# Patient Record
Sex: FEMALE | Race: OTHER | Employment: OTHER | URBAN - METROPOLITAN AREA
[De-identification: names, ages, dates, MRNs, and addresses within clinical notes are randomized per-mention and may not be internally consistent; named-entity substitution may affect disease eponyms.]

---

## 2017-07-24 NOTE — PATIENT DISCUSSION
NONPROLIFERATIVE DIABETIC RETINOPATHY OD: PATIENT INSTRUCTED TO RETURN TO PCP FOR CONTINUED BLOOD SUGAR MONITORING AND RETURN FOR FOLLOW-UP AS SCHEDULED FOR DILATED FUNDUS EXAM.

## 2021-11-09 ENCOUNTER — NEW PATIENT COMPREHENSIVE (OUTPATIENT)
Dept: URBAN - METROPOLITAN AREA CLINIC 35 | Facility: CLINIC | Age: 61
End: 2021-11-09

## 2021-11-09 DIAGNOSIS — H04.123: ICD-10-CM

## 2021-11-09 DIAGNOSIS — Z83.518: ICD-10-CM

## 2021-11-09 DIAGNOSIS — H52.7: ICD-10-CM

## 2021-11-09 PROCEDURE — 92015 DETERMINE REFRACTIVE STATE: CPT

## 2021-11-09 PROCEDURE — 92004 COMPRE OPH EXAM NEW PT 1/>: CPT

## 2021-11-09 ASSESSMENT — VISUAL ACUITY
OS_CC: J1-
OD_CC: J1
OS_SC: 20/20-1
OD_SC: 20/20-2

## 2021-11-09 ASSESSMENT — TONOMETRY
OD_IOP_MMHG: 16
OS_IOP_MMHG: 16

## 2021-12-23 NOTE — PATIENT DISCUSSION
PMH discussed panoptix mf IOL with PT, he advised pt that this IOL will get him out of glasses for most things but that he may still need glasses for fine print and driving at night etc.. PMH also warned pt of possibility of glare. PT understands and want to proceed with CAT SX and multifocal IOL.

## 2021-12-23 NOTE — PATIENT DISCUSSION
RBA'S DISCUSSED, PATIENT UNDERSTANDS AND WISHES TO PROCEED WITH SURGERY. DISCUSSED IOL OPTIONS, PATIENT UNDERSTANDS AND DESIRES PANOPTIX MULTIFOCAL TORIC OS.

## 2022-11-15 ENCOUNTER — COMPREHENSIVE EXAM (OUTPATIENT)
Dept: URBAN - METROPOLITAN AREA CLINIC 35 | Facility: CLINIC | Age: 62
End: 2022-11-15

## 2022-11-15 DIAGNOSIS — H04.123: ICD-10-CM

## 2022-11-15 DIAGNOSIS — H25.813: ICD-10-CM

## 2022-11-15 DIAGNOSIS — H52.7: ICD-10-CM

## 2022-11-15 DIAGNOSIS — Z83.518: ICD-10-CM

## 2022-11-15 PROCEDURE — 92014 COMPRE OPH EXAM EST PT 1/>: CPT

## 2022-11-15 PROCEDURE — 92015 DETERMINE REFRACTIVE STATE: CPT

## 2022-11-15 ASSESSMENT — VISUAL ACUITY
OD_SC: J6
OS_SC: 20/20
OD_SC: 20/20
OU_SC: J6
OS_SC: J12
OU_SC: 20/20

## 2022-11-15 ASSESSMENT — TONOMETRY
OS_IOP_MMHG: 15
OD_IOP_MMHG: 14

## 2023-11-21 ENCOUNTER — COMPREHENSIVE EXAM (OUTPATIENT)
Dept: URBAN - METROPOLITAN AREA CLINIC 35 | Facility: CLINIC | Age: 63
End: 2023-11-21

## 2023-11-21 DIAGNOSIS — H25.813: ICD-10-CM

## 2023-11-21 DIAGNOSIS — Z83.518: ICD-10-CM

## 2023-11-21 DIAGNOSIS — H04.123: ICD-10-CM

## 2023-11-21 DIAGNOSIS — H52.7: ICD-10-CM

## 2023-11-21 PROCEDURE — 92015 DETERMINE REFRACTIVE STATE: CPT

## 2023-11-21 PROCEDURE — 92134 CPTRZ OPH DX IMG PST SGM RTA: CPT

## 2023-11-21 PROCEDURE — 92014 COMPRE OPH EXAM EST PT 1/>: CPT

## 2023-11-21 ASSESSMENT — VISUAL ACUITY
OD_CC: 20/40-1
OS_CC: 20/40
OD_PH: 20/40
OS_BAT: 20/50
OD_BAT: 20/50
OS_PH: 20/40

## 2023-11-21 ASSESSMENT — TONOMETRY
OS_IOP_MMHG: 16
OD_IOP_MMHG: 16

## 2024-11-19 ENCOUNTER — COMPREHENSIVE EXAM (OUTPATIENT)
Dept: URBAN - METROPOLITAN AREA CLINIC 35 | Facility: CLINIC | Age: 64
End: 2024-11-19

## 2024-11-19 DIAGNOSIS — Z83.518: ICD-10-CM

## 2024-11-19 DIAGNOSIS — H04.123: ICD-10-CM

## 2024-11-19 DIAGNOSIS — H52.7: ICD-10-CM

## 2024-11-19 DIAGNOSIS — H25.813: ICD-10-CM

## 2024-11-19 PROCEDURE — 92015 DETERMINE REFRACTIVE STATE: CPT

## 2024-11-19 PROCEDURE — 92014 COMPRE OPH EXAM EST PT 1/>: CPT

## 2025-07-01 ENCOUNTER — FOLLOW UP (OUTPATIENT)
Age: 65
End: 2025-07-01

## 2025-07-01 DIAGNOSIS — H04.123: ICD-10-CM

## 2025-07-01 DIAGNOSIS — H25.813: ICD-10-CM

## 2025-07-01 DIAGNOSIS — Z83.518: ICD-10-CM

## 2025-07-01 PROCEDURE — 92014 COMPRE OPH EXAM EST PT 1/>: CPT

## 2025-07-01 PROCEDURE — 92134 CPTRZ OPH DX IMG PST SGM RTA: CPT
